# Patient Record
Sex: FEMALE | Race: WHITE
[De-identification: names, ages, dates, MRNs, and addresses within clinical notes are randomized per-mention and may not be internally consistent; named-entity substitution may affect disease eponyms.]

---

## 2022-04-13 ENCOUNTER — APPOINTMENT (OUTPATIENT)
Dept: WOUND CARE | Facility: CLINIC | Age: 72
End: 2022-04-13
Payer: MEDICARE

## 2022-04-13 VITALS
HEART RATE: 86 BPM | SYSTOLIC BLOOD PRESSURE: 162 MMHG | TEMPERATURE: 97.9 F | DIASTOLIC BLOOD PRESSURE: 94 MMHG | WEIGHT: 195 LBS | RESPIRATION RATE: 18 BRPM | BODY MASS INDEX: 33.29 KG/M2 | HEIGHT: 64 IN

## 2022-04-13 DIAGNOSIS — Z78.9 OTHER SPECIFIED HEALTH STATUS: ICD-10-CM

## 2022-04-13 DIAGNOSIS — Z86.718 PERSONAL HISTORY OF OTHER VENOUS THROMBOSIS AND EMBOLISM: ICD-10-CM

## 2022-04-13 PROBLEM — Z00.00 ENCOUNTER FOR PREVENTIVE HEALTH EXAMINATION: Status: ACTIVE | Noted: 2022-04-13

## 2022-04-13 PROCEDURE — 11042 DBRDMT SUBQ TIS 1ST 20SQCM/<: CPT

## 2022-04-13 PROCEDURE — 99204 OFFICE O/P NEW MOD 45 MIN: CPT | Mod: 25

## 2022-04-13 RX ORDER — METOPROLOL SUCCINATE 50 MG/1
50 TABLET, EXTENDED RELEASE ORAL
Refills: 0 | Status: ACTIVE | COMMUNITY

## 2022-04-13 RX ORDER — AMOXICILLIN AND CLAVULANATE POTASSIUM 500; 125 MG/1; 1/1
TABLET, FILM COATED ORAL
Refills: 0 | Status: ACTIVE | COMMUNITY

## 2022-04-13 RX ORDER — OMEPRAZOLE 40 MG/1
CAPSULE, DELAYED RELEASE ORAL
Refills: 0 | Status: ACTIVE | COMMUNITY

## 2022-04-13 RX ORDER — ATORVASTATIN CALCIUM 10 MG/1
10 TABLET, FILM COATED ORAL
Refills: 0 | Status: ACTIVE | COMMUNITY

## 2022-04-20 ENCOUNTER — APPOINTMENT (OUTPATIENT)
Dept: WOUND CARE | Facility: CLINIC | Age: 72
End: 2022-04-20
Payer: MEDICARE

## 2022-04-20 VITALS — DIASTOLIC BLOOD PRESSURE: 82 MMHG | HEART RATE: 82 BPM | SYSTOLIC BLOOD PRESSURE: 177 MMHG | TEMPERATURE: 97.6 F

## 2022-04-20 VITALS — TEMPERATURE: 97.6 F

## 2022-04-20 DIAGNOSIS — I10 ESSENTIAL (PRIMARY) HYPERTENSION: ICD-10-CM

## 2022-04-20 DIAGNOSIS — E78.00 PURE HYPERCHOLESTEROLEMIA, UNSPECIFIED: ICD-10-CM

## 2022-04-20 PROCEDURE — 11042 DBRDMT SUBQ TIS 1ST 20SQCM/<: CPT

## 2022-04-20 NOTE — PLAN
[FreeTextEntry1] : \par Follow up appointment scheduled for follow up with vascular surgery\par \par TeleHealth Services discussed with the patient and/or family.  Discharge instructions given including download of Jose [facility] information regarding:\par 1)  [facility] to obtain medical records\par 2)  [facility] to conduct Face-to-Face TeleHealth visit\par 3)  [employer] for the Patient (patient takes a picture of their wound which is sent to the patient's chart for review)\par

## 2022-04-20 NOTE — ASSESSMENT
[FreeTextEntry1] : Wound Assessment and Plan:\par \par The patient presents with a wound to the LLE.  Swelling noted to the extremity.  \par s/p excisional debridement\par DANAE/PVR \par \par Patient underwent evaluation for peripheral arterial disease using a [employer] diagnostic machine.  Ankle brachial index was obtained using blood pressure cuffs of the ankle and brachial segments of both legs.  A distal pulse volume recording was noted digitally on the device.  The procedure was supervised and interpreted by the physician.  DANAE of the right lower extremity error   secondary to movement. DANAE of the left lower extremity 1.2-.  Waveform noted to be biphasic   Patient tolerated procedure well in the office.  Results discussed with the patient.\par \par Standing venous reflux study scheduled.\par No clinical sign of infection\par Recommendation:\par \par Apply lidocaine or topical anesthetic if needed to reduce pain upon washing the wound.\par Wash wound with ----0.9% saline\par Apply ----Epiona\par Apply ----ACE bandage\par Change dressing ---daily\par Leg elevation as tolerated\par Encouraged ambulation or exercise.\par Optimization of nutrition.\par Offloading to the wound site.\par \par The patient was positioned as follows .  The fluorescence imaging device was positioned 8-12 cm from the patient and the clinical darkness required for imaging was obtained.  The wound measured by Tissue [employer]  The [employer] i:X fluorescence imaging device was used to report presence and location of red or cyan fluorescence, indicative of bacteria at loads greater than 104 CFU/g in real-time.  Images reported to be positive.  Due to presence of infection causing bacteria the wound was cleansed.  Fluorescence images acquired after cleansing reported no reduction in bacteria..  I then turned my attention to debridement, resulting in decrease in the fluorescence image.\par \par She read the consent and signed it prior to the initiation of any screening procedures.  She had the opportunity to discuss any questions regarding the study.  I witnessed the signing of the consent and the patient was given a copy of the signed consent.\par \par \par -----Wound supplies ordered via DME received\par Patient given contact information to DME\par \par Patient instructed on how to change dressing\par

## 2022-04-20 NOTE — PHYSICAL EXAM
[JVD] : no jugular venous distention  [Normal Breath Sounds] : Normal breath sounds [de-identified] : NAD, ambulatory [de-identified] : AT [de-identified] : supple

## 2022-04-20 NOTE — HISTORY OF PRESENT ILLNESS
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  h/o DVT April 5, 2022 by ultrasound started on Xarelto by PCP Dr. Singh. s/p endometrial tumor with subsequent chemoradiation and DVT 2014 where she was seen by Dr. Stevens. Patient states that she is cancer free. She likes to read and was sitting extensively. She had 4 deaths where she traveled since 6/6/22.  The patient has complaints of pain.   The patient has been dressing the wound with gauze.  The patient denies fevers or chills.  The patient has localized pain to the wound upon dressing changes.  The patient has no other complaints or associated symptoms.. Patient is on amoxicillin. \par

## 2022-04-27 ENCOUNTER — APPOINTMENT (OUTPATIENT)
Dept: WOUND CARE | Facility: CLINIC | Age: 72
End: 2022-04-27
Payer: MEDICARE

## 2022-04-27 VITALS — HEART RATE: 114 BPM | TEMPERATURE: 97.8 F | SYSTOLIC BLOOD PRESSURE: 172 MMHG | DIASTOLIC BLOOD PRESSURE: 109 MMHG

## 2022-04-27 PROCEDURE — 11042 DBRDMT SUBQ TIS 1ST 20SQCM/<: CPT

## 2022-04-27 RX ORDER — LIDOCAINE/TETRACAINE/BENZOCAIN 10-10-20 %
OINTMENT (GRAM) TOPICAL
Qty: 1 | Refills: 3 | Status: ACTIVE | COMMUNITY
Start: 2022-04-27 | End: 1900-01-01

## 2022-04-27 RX ORDER — TRAMADOL HYDROCHLORIDE 50 MG/1
50 TABLET, COATED ORAL
Qty: 14 | Refills: 0 | Status: ACTIVE | COMMUNITY
Start: 2022-04-27 | End: 1900-01-01

## 2022-04-27 RX ORDER — GENTAMICIN SULFATE 1 MG/G
0.1 OINTMENT TOPICAL DAILY
Qty: 1 | Refills: 1 | Status: ACTIVE | COMMUNITY
Start: 2022-04-27 | End: 1900-01-01

## 2022-04-29 ENCOUNTER — APPOINTMENT (OUTPATIENT)
Dept: WOUND CARE | Facility: CLINIC | Age: 72
End: 2022-04-29

## 2022-05-02 DIAGNOSIS — A49.01 METHICILLIN SUSCEPTIBLE STAPHYLOCOCCUS AUREUS INFECTION, UNSPECIFIED SITE: ICD-10-CM

## 2022-05-02 DIAGNOSIS — A49.8 OTHER BACTERIAL INFECTIONS OF UNSPECIFIED SITE: ICD-10-CM

## 2022-05-02 LAB — BACTERIA SPEC CULT: ABNORMAL

## 2022-05-02 RX ORDER — CIPROFLOXACIN HYDROCHLORIDE 500 MG/1
500 TABLET, FILM COATED ORAL
Qty: 14 | Refills: 0 | Status: ACTIVE | COMMUNITY
Start: 2022-05-02 | End: 1900-01-01

## 2022-05-02 RX ORDER — SULFAMETHOXAZOLE AND TRIMETHOPRIM 800; 160 MG/1; MG/1
800-160 TABLET ORAL TWICE DAILY
Qty: 14 | Refills: 0 | Status: ACTIVE | COMMUNITY
Start: 2022-05-02 | End: 1900-01-01

## 2022-05-03 NOTE — PLAN
[FreeTextEntry1] : 4/27/22\par Plan - follow up with derm. dr enciso, number given, needs bx.\par follow up vascular needs testing\par wash soap and water, dakins to clean, gentamicin to wound base\par pain meds prn, tramadol placed to pharmacy \par follow up next week \par

## 2022-05-03 NOTE — PLAN
[FreeTextEntry1] : \par Follow up appointment scheduled for follow up with vascular surgery\par \par TeleHealth Services discussed with the patient and/or family.  Discharge instructions given including download of Jose [facility] information regarding:\par 1)  [facility] to obtain medical records\par 2)  [facility] to conduct Face-to-Face TeleHealth visit\par 3)  [facility] for the Patient (patient takes a picture of their wound which is sent to the patient's chart for review)\par

## 2022-05-03 NOTE — PHYSICAL EXAM
[Normal Breath Sounds] : Normal breath sounds [JVD] : no jugular venous distention  [de-identified] : NAD, ambulatory [de-identified] : AT [de-identified] : supple

## 2022-05-03 NOTE — ASSESSMENT
[FreeTextEntry1] : Wound Assessment and Plan:\par \par The patient presents with a wound to the LLE.  Swelling noted to the extremity.  \par s/p excisional debridement\par DANAE/PVR \par \par Patient underwent evaluation for peripheral arterial disease using a [employer] diagnostic machine.  Ankle brachial index was obtained using blood pressure cuffs of the ankle and brachial segments of both legs.  A distal pulse volume recording was noted digitally on the device.  The procedure was supervised and interpreted by the physician.  DANAE of the right lower extremity error   secondary to movement. DANAE of the left lower extremity 1.2-.  Waveform noted to be biphasic   Patient tolerated procedure well in the office.  Results discussed with the patient.\par \par Standing venous reflux study scheduled.\par No clinical sign of infection\par Recommendation:\par \par Apply lidocaine or topical anesthetic if needed to reduce pain upon washing the wound.\par Wash wound with ----0.9% saline\par Apply ----Epiona\par Apply ----ACE bandage\par Change dressing ---daily\par Leg elevation as tolerated\par Encouraged ambulation or exercise.\par Optimization of nutrition.\par Offloading to the wound site.\par \par The patient was positioned as follows .  The fluorescence imaging device was positioned 8-12 cm from the patient and the clinical darkness required for imaging was obtained.  The wound measured by Tissue [employer]  The [employer] i:X fluorescence imaging device was used to report presence and location of red or cyan fluorescence, indicative of bacteria at loads greater than 104 CFU/g in real-time.  Images reported to be positive.  Due to presence of infection causing bacteria the wound was cleansed.  Fluorescence images acquired after cleansing reported no reduction in bacteria..  I then turned my attention to debridement, resulting in decrease in the fluorescence image.\par \par She read the consent and signed it prior to the initiation of any screening procedures.  She had the opportunity to discuss any questions regarding the study.  I witnessed the signing of the consent and the patient was given a copy of the signed consent.\par \par \par -----Wound supplies ordered via DME received\par Patient given contact information to DME\par \par Patient instructed on how to change dressing\par \par 4/27/22\par left leg wound extremely painful, was in epiona study for 1 week\par The patient was positioned to allow for optimization of imaging. The fluorescence imaging device was placed 8-12 cm from the patient and the clinical darkness required for imaging was obtained by a dark drape. The wound measured tissue analytics  on the /left ---leg. The [employer] i:X fluorescence imaging device was used to report presence and location of red or cyan fluorescence, indicative of bacteria at loads greater than 104 CFU/g in real-time. Images reported to have ---no/red/cyan----fluorescence. The wound was ----mechanically cleansed with vashe and/or underwent excisional debridement.  Fluorescence images acquired after cleansing demonstrated reduction in bacteria.\par \par

## 2022-05-03 NOTE — PHYSICAL EXAM
[Normal Breath Sounds] : Normal breath sounds [JVD] : no jugular venous distention  [de-identified] : NAD, ambulatory [de-identified] : AT [de-identified] : supple

## 2022-05-03 NOTE — ASSESSMENT
[FreeTextEntry1] : Wound Assessment and Plan:\par \par The patient presents with a wound to the LLE.  Swelling noted to the extremity.  \par DANAE/PVR and standing venous reflux study scheduled.\par No clinical sign of infection\par Recommendation:\par \par Apply lidocaine or topical anesthetic if needed to reduce pain upon washing the wound.\par Wash wound with ----0.9% saline\par Apply ----santyl\par Apply ----ACE bandage\par Change dressing ---daily\par Leg elevation as tolerated\par Encouraged ambulation or exercise.\par Optimization of nutrition.\par Offloading to the wound site.\par \par The patient was positioned as follows .  The fluorescence imaging device was positioned 8-12 cm from the patient and the clinical darkness required for imaging was obtained.  The wound measured by Tissue [employer]..  The [employer] i:X fluorescence imaging device was used to report presence and location of red or cyan fluorescence, indicative of bacteria at loads greater than 104 CFU/g in real-time.  Images reported to be positive.  Due to presence of infection causing bacteria the wound was cleansed.  Fluorescence images acquired after cleansing reported no reduction in bacteria..  I then turned my attention to debridement, resulting in decrease in the fluorescence image.\par \par \par -----Wound supplies ordered via DME\par Patient given contact information to DME\par \par Patient instructed on how to change dressing\par

## 2022-05-04 ENCOUNTER — APPOINTMENT (OUTPATIENT)
Dept: WOUND CARE | Facility: CLINIC | Age: 72
End: 2022-05-04
Payer: MEDICARE

## 2022-05-04 VITALS
HEIGHT: 64 IN | RESPIRATION RATE: 16 BRPM | SYSTOLIC BLOOD PRESSURE: 145 MMHG | DIASTOLIC BLOOD PRESSURE: 86 MMHG | WEIGHT: 194 LBS | HEART RATE: 91 BPM | BODY MASS INDEX: 33.12 KG/M2

## 2022-05-04 VITALS — TEMPERATURE: 97.4 F

## 2022-05-04 DIAGNOSIS — I83.90 ASYMPTOMATIC VARICOSE VEINS OF UNSPECIFIED LOWER EXTREMITY: ICD-10-CM

## 2022-05-04 PROCEDURE — 93922 UPR/L XTREMITY ART 2 LEVELS: CPT

## 2022-05-04 PROCEDURE — 99213 OFFICE O/P EST LOW 20 MIN: CPT

## 2022-05-04 RX ORDER — TRIAMCINOLONE ACETONIDE 0.25 MG/G
0.03 OINTMENT TOPICAL 3 TIMES DAILY
Qty: 1 | Refills: 1 | Status: ACTIVE | COMMUNITY
Start: 2022-05-04 | End: 1900-01-01

## 2022-05-04 NOTE — PHYSICAL EXAM
[Normal Breath Sounds] : Normal breath sounds [JVD] : no jugular venous distention  [de-identified] : NAD, ambulatory [de-identified] : AT [de-identified] : supple

## 2022-05-04 NOTE — PLAN
[FreeTextEntry1] : 5/4/22\par Plan - shower wound, using BLT to numb before shower gentamicin to wound base, triamcinolone to wound edges/adaptic/kamini/ace\par derm consult 5/17\par awaiting vascular workup - will have office call pt and arrange\par follow up after derm work up - 2 weeks

## 2022-05-04 NOTE — ASSESSMENT
[FreeTextEntry1] : Wound Assessment and Plan:\par \par The patient presents with a wound to the LLE.  Swelling noted to the extremity.  \par s/p excisional debridement\par DANAE/PVR \par \par Patient underwent evaluation for peripheral arterial disease using a [employer] diagnostic machine.  Ankle brachial index was obtained using blood pressure cuffs of the ankle and brachial segments of both legs.  A distal pulse volume recording was noted digitally on the device.  The procedure was supervised and interpreted by the physician.  DANAE of the right lower extremity error   secondary to movement. DANAE of the left lower extremity 1.2-.  Waveform noted to be biphasic   Patient tolerated procedure well in the office.  Results discussed with the patient.\par \par Standing venous reflux study scheduled.\par No clinical sign of infection\par Recommendation:\par \par Apply lidocaine or topical anesthetic if needed to reduce pain upon washing the wound.\par Wash wound with ----0.9% saline\par Apply ----Epiona\par Apply ----ACE bandage\par Change dressing ---daily\par Leg elevation as tolerated\par Encouraged ambulation or exercise.\par Optimization of nutrition.\par Offloading to the wound site.\par \par The patient was positioned as follows .  The fluorescence imaging device was positioned 8-12 cm from the patient and the clinical darkness required for imaging was obtained.  The wound measured by Tissue "[employer]s..  The [employer] i:X fluorescence imaging device was used to report presence and location of red or cyan fluorescence, indicative of bacteria at loads greater than 104 CFU/g in real-time.  Images reported to be positive.  Due to presence of infection causing bacteria the wound was cleansed.  Fluorescence images acquired after cleansing reported no reduction in bacteria..  I then turned my attention to debridement, resulting in decrease in the fluorescence image.\par \par She read the consent and signed it prior to the initiation of any screening procedures.  She had the opportunity to discuss any questions regarding the study.  I witnessed the signing of the consent and the patient was given a copy of the signed consent.\par \par \par -----Wound supplies ordered via DME received\par Patient given contact information to DME\par \par Patient instructed on how to change dressing\par \par 4/27/22\par left leg wound extremely painful, was in epiona study for 1 week\par The patient was positioned to allow for optimization of imaging. The fluorescence imaging device was placed 8-12 cm from the patient and the clinical darkness required for imaging was obtained by a dark drape. The wound measured tissue analytics  on the /left ---leg. The [employer] i:X fluorescence imaging device was used to report presence and location of red or cyan fluorescence, indicative of bacteria at loads greater than 104 CFU/g in real-time. Images reported to have ---no/red/cyan----fluorescence. The wound was ----mechanically cleansed with vashe and/or underwent excisional debridement.  Fluorescence images acquired after cleansing demonstrated reduction in bacteria.\par 5/4/22\par culture grew out pseudomonas and staph, on cipro, will wait and take bactrim when finished with first\par

## 2022-05-04 NOTE — HISTORY OF PRESENT ILLNESS
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  h/o DVT April 5, 2022 by ultrasound started on Xarelto by PCP Dr. Singh. s/p endometrial tumor with subsequent chemoradiation and DVT 2014 where she was seen by Dr. Stevens. Patient states that she is cancer free. She likes to read and was sitting extensively. She had 4 deaths where she traveled since 6/6/22.  The patient has complaints of pain.   The patient has been dressing the wound with gauze.  The patient denies fevers or chills.  The patient has localized pain to the wound upon dressing changes.  The patient has no other complaints or associated symptoms.. Patient is on amoxicillin. \par \par 5/4/22\par The patient was positioned to allow for optimization of imaging. The fluorescence imaging device was placed 8-12 cm from the patient and the clinical darkness required for imaging was obtained by a dark drape. The wound measured   ----- cm2 on the /left ---leg/foot). The [employer] i:X fluorescence imaging device was used to report presence and location of NO  red or cyan fluorescence, indicative of bacteria at loads greater than 104 CFU/g in real-time. \par s/p excisional debridement\par suspicious for pyoderma, purplish hue to periwound, wound is very painful\par

## 2022-05-06 ENCOUNTER — APPOINTMENT (OUTPATIENT)
Dept: VASCULAR SURGERY | Facility: CLINIC | Age: 72
End: 2022-05-06
Payer: MEDICARE

## 2022-05-06 VITALS
BODY MASS INDEX: 33.12 KG/M2 | TEMPERATURE: 96.5 F | WEIGHT: 194 LBS | SYSTOLIC BLOOD PRESSURE: 147 MMHG | DIASTOLIC BLOOD PRESSURE: 78 MMHG | HEART RATE: 90 BPM | HEIGHT: 64 IN

## 2022-05-06 PROCEDURE — 99204 OFFICE O/P NEW MOD 45 MIN: CPT

## 2022-05-06 PROCEDURE — 93971 EXTREMITY STUDY: CPT

## 2022-05-06 NOTE — ASSESSMENT
[FreeTextEntry1] : 72F with LLE wound medial malleollus, + LE DVT.  No reflux noted on venous duplex.  DVT from gastroc to SFV. \par \par UNNA boot applied\par Follow up in wound care center \par Derm for pyoderma biopsy \par Follow up in one month

## 2022-05-06 NOTE — HISTORY OF PRESENT ILLNESS
[FreeTextEntry1] : 72F with LLE wound medial malleolus present for 3 months despite local wound care and abx.  Patient has history of LLE DVT 2014 for which she was on Xarelto- provoked secondary to endometrial CA.  Patient now with new LLE DVT diagnosed 4-5 weeks prior at PCP, again on Xarelto.  Admits to LE swelling, does not wear compression.  Denies claudication symptoms.  \par \par PMH- endometrial CA, HTN, HLD, [place]''s dx\par PSH- hysterectomy\par Meds- metoprolol, statin\par Allergies- NKDA\par Social- denies tobacco, illicit drug use.  + EtOH, daily glass of wine.

## 2022-05-06 NOTE — END OF VISIT
[] : Fellow [FreeTextEntry3] : medial mal ulcer, in the setting of dvt/venous hypertension\par compression, local wound care\par pt to have biopsy to ro pyoderma/malignancy [Time Spent: ___ minutes] : I have spent [unfilled] minutes of time on the encounter.

## 2022-05-06 NOTE — PHYSICAL EXAM
[Normal Breath Sounds] : Normal breath sounds [Normal Rate and Rhythm] : normal rate and rhythm [2+] : left 2+ [Ankle Swelling (On Exam)] : present [Ankle Swelling Bilaterally] : bilaterally  [] : of the left leg [Ankle Swelling On The Left] : moderate [Skin Ulcer] : ulcer [Alert] : alert [Oriented to Person] : oriented to person [Oriented to Place] : oriented to place [Oriented to Time] : oriented to time [Anxious] : anxious [JVD] : no jugular venous distention  [Varicose Veins Of Lower Extremities] : not present [de-identified] : NAD, obese [de-identified] : LLE wound above medial malleolus approx 6cm in diameter without purulence or foul smell

## 2022-05-06 NOTE — REVIEW OF SYSTEMS
[As Noted in HPI] : as noted in HPI [Lower Ext Edema] : lower extremity edema [Negative] : Heme/Lymph [Leg Claudication] : no intermittent leg claudication

## 2022-05-11 ENCOUNTER — APPOINTMENT (OUTPATIENT)
Dept: WOUND CARE | Facility: CLINIC | Age: 72
End: 2022-05-11

## 2022-05-11 ENCOUNTER — APPOINTMENT (OUTPATIENT)
Dept: WOUND CARE | Facility: CLINIC | Age: 72
End: 2022-05-11
Payer: MEDICARE

## 2022-05-11 VITALS
HEART RATE: 91 BPM | SYSTOLIC BLOOD PRESSURE: 148 MMHG | HEIGHT: 64 IN | TEMPERATURE: 96.2 F | RESPIRATION RATE: 16 BRPM | WEIGHT: 194 LBS | BODY MASS INDEX: 33.12 KG/M2 | DIASTOLIC BLOOD PRESSURE: 84 MMHG

## 2022-05-11 PROCEDURE — 99213 OFFICE O/P EST LOW 20 MIN: CPT | Mod: 25

## 2022-05-11 PROCEDURE — 11042 DBRDMT SUBQ TIS 1ST 20SQCM/<: CPT

## 2022-05-12 NOTE — HISTORY OF PRESENT ILLNESS
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  h/o DVT April 5, 2022 by ultrasound started on Xarelto by PCP Dr. Singh. s/p endometrial tumor with subsequent chemoradiation and DVT 2014 where she was seen by Dr. Stevens. Patient states that she is cancer free. She likes to read and was sitting extensively. She had 4 deaths where she traveled since 6/6/22.  The patient has complaints of pain.   The patient has been dressing the wound with gauze.  The patient denies fevers or chills.  The patient has localized pain to the wound upon dressing changes.  The patient has no other complaints or associated symptoms.. Patient is on amoxicillin. \par \par 5/4/22\par The patient was positioned to allow for optimization of imaging. The fluorescence imaging device was placed 8-12 cm from the patient and the clinical darkness required for imaging was obtained by a dark drape. The wound measured   ----- cm2 on the /left ---leg/foot). The [employer] i:X fluorescence imaging device was used to report presence and location of NO  red or cyan fluorescence, indicative of bacteria at loads greater than 104 CFU/g in real-time. \par s/p excisional debridement\par suspicious for pyoderma, purplish hue to periwound, wound is very painful\par

## 2022-05-12 NOTE — PHYSICAL EXAM
[Normal Breath Sounds] : Normal breath sounds [JVD] : no jugular venous distention  [de-identified] : NAD, ambulatory [de-identified] : AT [de-identified] : supple

## 2022-05-12 NOTE — ASSESSMENT
[FreeTextEntry1] : Wound Assessment and Plan:\par \par The patient presents with a wound to the LLE.  Swelling noted to the extremity.  \par s/p excisional debridement\par DANAE/PVR \par \par Patient underwent evaluation for peripheral arterial disease using a [employer] diagnostic machine.  Ankle brachial index was obtained using blood pressure cuffs of the ankle and brachial segments of both legs.  A distal pulse volume recording was noted digitally on the device.  The procedure was supervised and interpreted by the physician.  DANAE of the right lower extremity error   secondary to movement. DANAE of the left lower extremity 1.2-.  Waveform noted to be biphasic   Patient tolerated procedure well in the office.  Results discussed with the patient.\par \par Standing venous reflux study scheduled.\par No clinical sign of infection\par Recommendation:\par \par \par Leg elevation as tolerated\par Encouraged ambulation or exercise.\par Optimization of nutrition.\par Offloading to the wound site.\par \par The patient was positioned as follows .  The fluorescence imaging device was positioned 8-12 cm from the patient and the clinical darkness required for imaging was obtained.  The wound measured by Tissue [employer]..  The "[employer] i:X fluorescence imaging device was used to report presence and location of red or cyan fluorescence, indicative of bacteria at loads greater than 104 CFU/g in real-time.  Images reported to be positive.  Due to presence of infection causing bacteria the wound was cleansed.  Fluorescence images acquired after cleansing reported no reduction in bacteria..  I then turned my attention to debridement, resulting in decrease in the fluorescence image.\par \par She read the consent and signed it prior to the initiation of any screening procedures.  She had the opportunity to discuss any questions regarding the study.  I witnessed the signing of the consent and the patient was given a copy of the signed consent.\par \par \par -----Wound supplies ordered via DME received\par Patient given contact information to DME\par \par Patient instructed on how to change dressing\par \par 4/27/22\par left leg wound extremely painful, was in epiona study for 1 week\par The patient was positioned to allow for optimization of imaging. The fluorescence imaging device was placed 8-12 cm from the patient and the clinical darkness required for imaging was obtained by a dark drape. The wound measured tissue analytics  on the /left ---leg. The "[employer] i:X fluorescence imaging device was used to report presence and location of red or cyan fluorescence, indicative of bacteria at loads greater than 104 CFU/g in real-time. Images reported to have ---no/red/cyan----fluorescence. The wound was ----mechanically cleansed with vashe and/or underwent excisional debridement.  Fluorescence images acquired after cleansing demonstrated reduction in bacteria.\par 5/4/22\par culture grew out pseudomonas and staph, on cipro, will wait and take bactrim when finished with first\par 5/11/22\par pt. taking abx. as prescribed\par wound still remains painful, appearance slightly better, been applying gentamicin\par saw vascular - no evidence of venous insufficiency\par slight purplish halo to periwound, wound not as hypergranular\par s/p excisional debridement\par

## 2022-05-12 NOTE — PLAN
[FreeTextEntry1] : 5/11/22\par Plan - shower wound, using BLT to numb before shower gentamicin to wound base, triamcinolone to wound edges/adaptic/kamini/ace\par derm consult 5/17\par follow up 1 week

## 2022-05-18 ENCOUNTER — APPOINTMENT (OUTPATIENT)
Dept: WOUND CARE | Facility: CLINIC | Age: 72
End: 2022-05-18

## 2022-05-18 ENCOUNTER — APPOINTMENT (OUTPATIENT)
Dept: WOUND CARE | Facility: CLINIC | Age: 72
End: 2022-05-18
Payer: MEDICARE

## 2022-05-18 VITALS
DIASTOLIC BLOOD PRESSURE: 85 MMHG | TEMPERATURE: 97.6 F | HEART RATE: 88 BPM | BODY MASS INDEX: 33.3 KG/M2 | SYSTOLIC BLOOD PRESSURE: 130 MMHG | RESPIRATION RATE: 16 BRPM | WEIGHT: 194 LBS

## 2022-05-18 PROCEDURE — 29581 APPL MULTLAYER CMPRN SYS LEG: CPT | Mod: LT

## 2022-05-20 NOTE — ASSESSMENT
[FreeTextEntry1] : Wound Assessment and Plan:\par \par The patient presents with a wound to the LLE.  Swelling noted to the extremity.  \par s/p excisional debridement\par DANAE/PVR \par \par Patient underwent evaluation for peripheral arterial disease using a [employer] diagnostic machine.  Ankle brachial index was obtained using blood pressure cuffs of the ankle and brachial segments of both legs.  A distal pulse volume recording was noted digitally on the device.  The procedure was supervised and interpreted by the physician.  DANAE of the right lower extremity error   secondary to movement. DANAE of the left lower extremity 1.2-.  Waveform noted to be biphasic   Patient tolerated procedure well in the office.  Results discussed with the patient.\par \par Standing venous reflux study scheduled.\par No clinical sign of infection\par Recommendation:\par \par \par Leg elevation as tolerated\par Encouraged ambulation or exercise.\par Optimization of nutrition.\par Offloading to the wound site.\par \par The patient was positioned as follows .  The fluorescence imaging device was positioned 8-12 cm from the patient and the clinical darkness required for imaging was obtained.  The wound measured by Tissue [employer]..  The [employer] i:X fluorescence imaging device was used to report presence and location of red or cyan fluorescence, indicative of bacteria at loads greater than 104 CFU/g in real-time.  Images reported to be positive.  Due to presence of infection causing bacteria the wound was cleansed.  Fluorescence images acquired after cleansing reported no reduction in bacteria..  I then turned my attention to debridement, resulting in decrease in the fluorescence image.\par \par She read the consent and signed it prior to the initiation of any screening procedures.  She had the opportunity to discuss any questions regarding the study.  I witnessed the signing of the consent and the patient was given a copy of the signed consent.\par \par \par -----Wound supplies ordered via DME received\par Patient given contact information to DME\par \par Patient instructed on how to change dressing\par \par 4/27/22\par left leg wound extremely painful, was in epiona study for 1 week\par The patient was positioned to allow for optimization of imaging. The fluorescence imaging device was placed 8-12 cm from the patient and the clinical darkness required for imaging was obtained by a dark drape. The wound measured tissue analytics  on the /left ---leg. The [employer] i:X fluorescence imaging device was used to report presence and location of red or cyan fluorescence, indicative of bacteria at loads greater than 104 CFU/g in real-time. Images reported to have ---no/red/cyan----fluorescence. The wound was ----mechanically cleansed with vashe and/or underwent excisional debridement.  Fluorescence images acquired after cleansing demonstrated reduction in bacteria.\par 5/4/22\par culture grew out pseudomonas and staph, on cipro, will wait and take bactrim when finished with first\par 5/11/22\par pt. taking abx. as prescribed\par wound still remains painful, appearance slightly better, been applying gentamicin\par saw vascular - no evidence of venous insufficiency\par slight purplish halo to periwound, wound not as hypergranular\par s/p excisional debridement\par 5/18/22\par saw derm. yesterday, biopsy taken\par placed on doxy. and sulfasalazine\par no debridement today, wound being treated with gentamicin and santyl, slough is improved\par

## 2022-05-20 NOTE — PLAN
[FreeTextEntry1] : 5/18/222\par Plan - santyl/gent. to wound/adaptic/xtrasorb/unna\par take probiotic while finishing up bactrim and starting on doxy\par follow up next week\par supplies ordered\par leave on unna till saturday, then remove, shower, santyl/gent.adaptic/kamini/ace\par following up with derm in june

## 2022-05-20 NOTE — HISTORY OF PRESENT ILLNESS
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  h/o DVT April 5, 2022 by ultrasound started on Xarelto by PCP Dr. Singh. s/p endometrial tumor with subsequent chemoradiation and DVT 2014 where she was seen by Dr. Stevens. Patient states that she is cancer free. She likes to read and was sitting extensively. She had 4 deaths where she traveled since 6/6/22.  The patient has complaints of pain.   The patient has been dressing the wound with gauze.  The patient denies fevers or chills.  The patient has localized pain to the wound upon dressing changes.  The patient has no other complaints or associated symptoms.. Patient is on amoxicillin. \par \par 5/4/22\par The patient was positioned to allow for optimization of imaging. The fluorescence imaging device was placed 8-12 cm from the patient and the clinical darkness required for imaging was obtained by a dark drape. The wound measured   ----- cm2 on the /left ---leg/foot). The [employer] i:X fluorescence imaging device was used to report presence and location of NO  red or cyan fluorescence, indicative of bacteria at loads greater than 104 CFU/g in real-time. \par s/p excisional debridement\par suspicious for pyoderma, purplish hue to periwound, wound is very painful\par

## 2022-05-20 NOTE — PHYSICAL EXAM
[Normal Breath Sounds] : Normal breath sounds [JVD] : no jugular venous distention  [de-identified] : NAD, ambulatory [de-identified] : AT [de-identified] : supple

## 2022-05-24 ENCOUNTER — APPOINTMENT (OUTPATIENT)
Dept: WOUND CARE | Facility: CLINIC | Age: 72
End: 2022-05-24
Payer: MEDICARE

## 2022-05-24 PROCEDURE — 11042 DBRDMT SUBQ TIS 1ST 20SQCM/<: CPT

## 2022-05-24 RX ORDER — COLLAGENASE SANTYL 250 [ARB'U]/G
250 OINTMENT TOPICAL DAILY
Qty: 1 | Refills: 0 | Status: ACTIVE | COMMUNITY
Start: 2022-05-24 | End: 1900-01-01

## 2022-05-27 NOTE — HISTORY OF PRESENT ILLNESS
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  h/o DVT April 5, 2022 by ultrasound started on Xarelto by PCP Dr. Singh. s/p endometrial tumor with subsequent chemoradiation and DVT 2014 where she was seen by Dr. Stevens. Patient states that she is cancer free. She likes to read and was sitting extensively. She had 4 deaths where she traveled since 6/6/22.  The patient has complaints of pain.   The patient has been dressing the wound with gauze.  The patient denies fevers or chills.  The patient has localized pain to the wound upon dressing changes.  The patient has no other complaints or associated symptoms.. Patient is on amoxicillin. \par \par 5/4/22\par The patient was positioned to allow for optimization of imaging. The fluorescence imaging device was placed 8-12 cm from the patient and the clinical darkness required for imaging was obtained by a dark drape. The wound measured   ----- cm2 on the /left ---leg/foot). The [employer] i:X fluorescence imaging device was used to report presence and location of NO  red or cyan fluorescence, indicative of bacteria at loads greater than 104 CFU/g in real-time. \par s/p excisional debridement\par suspicious for pyoderma, purplish hue to periwound, wound is very painful\par

## 2022-05-27 NOTE — PLAN
[FreeTextEntry1] : 5/24/22\par Plan - stop gent. start santyl\par santyl to vivo, pt. will call to see co-pay  and start using to wound bed/adaptic/kamini/unna\par follow up 2 weeks

## 2022-05-27 NOTE — PHYSICAL EXAM
[Normal Breath Sounds] : Normal breath sounds [JVD] : no jugular venous distention  [de-identified] : NAD, ambulatory [de-identified] : AT [de-identified] : supple

## 2022-05-27 NOTE — ASSESSMENT
[FreeTextEntry1] : Wound Assessment and Plan:\par \par The patient presents with a wound to the LLE.  Swelling noted to the extremity.  \par s/p excisional debridement\par DANAE/PVR \par \par Patient underwent evaluation for peripheral arterial disease using a [employer] diagnostic machine.  Ankle brachial index was obtained using blood pressure cuffs of the ankle and brachial segments of both legs.  A distal pulse volume recording was noted digitally on the device.  The procedure was supervised and interpreted by the physician.  DANAE of the right lower extremity error   secondary to movement. DANAE of the left lower extremity 1.2-.  Waveform noted to be biphasic   Patient tolerated procedure well in the office.  Results discussed with the patient.\par \par Standing venous reflux study scheduled.\par No clinical sign of infection\par Recommendation:\par \par \par Leg elevation as tolerated\par Encouraged ambulation or exercise.\par Optimization of nutrition.\par Offloading to the wound site.\par \par The patient was positioned as follows .  The fluorescence imaging device was positioned 8-12 cm from the patient and the clinical darkness required for imaging was obtained.  The wound measured by Tissue [employer]..  The [employer] i:X fluorescence imaging device was used to report presence and location of red or cyan fluorescence, indicative of bacteria at loads greater than 104 CFU/g in real-time.  Images reported to be positive.  Due to presence of infection causing bacteria the wound was cleansed.  Fluorescence images acquired after cleansing reported no reduction in bacteria..  I then turned my attention to debridement, resulting in decrease in the fluorescence image.\par \par She read the consent and signed it prior to the initiation of any screening procedures.  She had the opportunity to discuss any questions regarding the study.  I witnessed the signing of the consent and the patient was given a copy of the signed consent.\par \par \par -----Wound supplies ordered via DME received\par Patient given contact information to DME\par \par Patient instructed on how to change dressing\par \par 4/27/22\par left leg wound extremely painful, was in epiona study for 1 week\par The patient was positioned to allow for optimization of imaging. The fluorescence imaging device was placed 8-12 cm from the patient and the clinical darkness required for imaging was obtained by a dark drape. The wound measured tissue analytics  on the /left ---leg. The [employer] i:X fluorescence imaging device was used to report presence and location of red or cyan fluorescence, indicative of bacteria at loads greater than 104 CFU/g in real-time. Images reported to have ---no/red/cyan----fluorescence. The wound was ----mechanically cleansed with vashe and/or underwent excisional debridement.  Fluorescence images acquired after cleansing demonstrated reduction in bacteria.\par 5/4/22\par culture grew out pseudomonas and staph, on cipro, will wait and take bactrim when finished with first\par 5/11/22\par pt. taking abx. as prescribed\par wound still remains painful, appearance slightly better, been applying gentamicin\par saw vascular - no evidence of venous insufficiency\par slight purplish halo to periwound, wound not as hypergranular\par s/p excisional debridement\par 5/18/22\par saw derm. yesterday, biopsy taken\par placed on doxy. and sulfasalazine\par no debridement today, wound being treated with gentamicin and santyl, slough is improved\par 5/24/22\par still on doxy. sulfasalazine per derm.\par light s/p excisional debridement, of moist yellow slough\par wound is slowly more granular \par using gentamicin\par awaiting pathology of wound\par wound with more pink tissue coming through\par \par \par

## 2022-06-10 ENCOUNTER — APPOINTMENT (OUTPATIENT)
Dept: VASCULAR SURGERY | Facility: CLINIC | Age: 72
End: 2022-06-10
Payer: MEDICARE

## 2022-06-10 ENCOUNTER — APPOINTMENT (OUTPATIENT)
Dept: VASCULAR SURGERY | Facility: CLINIC | Age: 72
End: 2022-06-10

## 2022-06-10 PROCEDURE — 99442: CPT | Mod: 95

## 2022-06-10 RX ORDER — PENTOXIFYLLINE 400 MG/1
400 TABLET, EXTENDED RELEASE ORAL 3 TIMES DAILY
Qty: 180 | Refills: 0 | Status: ACTIVE | COMMUNITY
Start: 2022-06-10 | End: 1900-01-01

## 2022-06-10 NOTE — ASSESSMENT
[FreeTextEntry1] : lower leg ulcer, chronic venous hypertension from DVT\par cont AC (on xarelto)\par local wound care, has fu \par fu pathology from bios, I have asked her to obtain records for us to review\par \par I have recommended pentoxyfylline, rx sent to pharmacy\par \par fu 1 mo, LLE duplex

## 2022-06-10 NOTE — HISTORY OF PRESENT ILLNESS
[FreeTextEntry1] : 72F with LLE wound medial malleolus present for 3 months despite local wound care and abx. Patient has history of LLE DVT 2014 for which she was on Xarelto- provoked secondary to endometrial CA. Patient now with new LLE DVT diagnosed 4-5 weeks prior at PCP, again on Xarelto. Admits to LE swelling, does not wear compression. Denies claudication symptoms. \par \par PMH- endometrial CA, HTN, HLD, Antolin''s dx\par PSH- hysterectomy\par Meds- metoprolol, statin\par Allergies- NKDA\par Social- denies tobacco, illicit drug use. + EtOH, daily glass of wine. \par   [de-identified] : 6/10/22: telephone visit.  pt has been seeing wound care regularly, also had biopsy w derm.  Per pt biosy showed no malignancy, however I do not have these results.  She is still experiencing some throbbing discomfort, but overall improved.  Wound appearance also noted to be improved.  no fevers.

## 2022-06-10 NOTE — PHYSICAL EXAM
[JVD] : no jugular venous distention  [Normal Breath Sounds] : Normal breath sounds [Normal Heart Sounds] : normal heart sounds [2+] : left 2+ [Ankle Swelling (On Exam)] : present [Ankle Swelling Bilaterally] : bilaterally  [] : of the left leg [Ankle Swelling On The Left] : moderate [Alert] : alert [Oriented to Person] : oriented to person [Oriented to Place] : oriented to place [Oriented to Time] : oriented to time [de-identified] : nad [de-identified] : ulcer . LLE wound above medial malleolus approx 6cm in diameter without purulence or foul smell.

## 2022-06-17 ENCOUNTER — APPOINTMENT (OUTPATIENT)
Dept: WOUND CARE | Facility: CLINIC | Age: 72
End: 2022-06-17

## 2022-06-17 PROCEDURE — 29581 APPL MULTLAYER CMPRN SYS LEG: CPT

## 2022-06-21 NOTE — ASSESSMENT
[FreeTextEntry1] : Wound Assessment and Plan:\par \par The patient presents with a wound to the LLE.  Swelling noted to the extremity.  \par s/p excisional debridement\par DANAE/PVR \par \par Patient underwent evaluation for peripheral arterial disease using a [employer] diagnostic machine.  Ankle brachial index was obtained using blood pressure cuffs of the ankle and brachial segments of both legs.  A distal pulse volume recording was noted digitally on the device.  The procedure was supervised and interpreted by the physician.  DANAE of the right lower extremity error   secondary to movement. DANAE of the left lower extremity 1.2-.  Waveform noted to be biphasic   Patient tolerated procedure well in the office.  Results discussed with the patient.\par \par Standing venous reflux study scheduled.\par No clinical sign of infection\par Recommendation:\par \par \par Leg elevation as tolerated\par Encouraged ambulation or exercise.\par Optimization of nutrition.\par Offloading to the wound site.\par \par The patient was positioned as follows .  The fluorescence imaging device was positioned 8-12 cm from the patient and the clinical darkness required for imaging was obtained.  The wound measured by Tissue [employer]..  The [employer] i:X fluorescence imaging device was used to report presence and location of red or cyan fluorescence, indicative of bacteria at loads greater than 104 CFU/g in real-time.  Images reported to be positive.  Due to presence of infection causing bacteria the wound was cleansed.  Fluorescence images acquired after cleansing reported no reduction in bacteria..  I then turned my attention to debridement, resulting in decrease in the fluorescence image.\par \par She read the consent and signed it prior to the initiation of any screening procedures.  She had the opportunity to discuss any questions regarding the study.  I witnessed the signing of the consent and the patient was given a copy of the signed consent.\par \par \par -----Wound supplies ordered via DME received\par Patient given contact information to DME\par \par Patient instructed on how to change dressing\par \par 4/27/22\par left leg wound extremely painful, was in epiona study for 1 week\par The patient was positioned to allow for optimization of imaging. The fluorescence imaging device was placed 8-12 cm from the patient and the clinical darkness required for imaging was obtained by a dark drape. The wound measured tissue analytics  on the /left ---leg. The [employer] i:X fluorescence imaging device was used to report presence and location of red or cyan fluorescence, indicative of bacteria at loads greater than 104 CFU/g in real-time. Images reported to have ---no/red/cyan----fluorescence. The wound was ----mechanically cleansed with vashe and/or underwent excisional debridement.  Fluorescence images acquired after cleansing demonstrated reduction in bacteria.\par 5/4/22\par culture grew out pseudomonas and staph, on cipro, will wait and take bactrim when finished with first\par 5/11/22\par pt. taking abx. as prescribed\par wound still remains painful, appearance slightly better, been applying gentamicin\par saw vascular - no evidence of venous insufficiency\par slight purplish halo to periwound, wound not as hypergranular\par s/p excisional debridement\par 5/18/22\par saw derm. yesterday, biopsy taken\par placed on doxy. and sulfasalazine\par no debridement today, wound being treated with gentamicin and santyl, slough is improved\par 5/24/22\par still on doxy. sulfasalazine per derm.\par light s/p excisional debridement, of moist yellow slough\par wound is slowly more granular \par using gentamicin\par awaiting pathology of wound\par wound with more pink tissue coming through\par 6/17/22\par wound with slow progress, mechanical debridement\par has been using santyl and gentamicin, granular tissue coming through\par followed up with derm. feels it is related to prior DVT\par responds well to compression\par \par \par

## 2022-06-21 NOTE — HISTORY OF PRESENT ILLNESS
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  h/o DVT April 5, 2022 by ultrasound started on Xarelto by PCP Dr. Singh. s/p endometrial tumor with subsequent chemoradiation and DVT 2014 where she was seen by Dr. Stevens. Patient states that she is cancer free. She likes to read and was sitting extensively. She had 4 deaths where she traveled since 6/6/22.  The patient has complaints of pain.   The patient has been dressing the wound with gauze.  The patient denies fevers or chills.  The patient has localized pain to the wound upon dressing changes.  The patient has no other complaints or associated symptoms.. Patient is on amoxicillin. \par \par 5/4/22\par The patient was positioned to allow for optimization of imaging. The fluorescence imaging device was placed 8-12 cm from the patient and the clinical darkness required for imaging was obtained by a dark drape. The wound measured   ----- cm2 on the /left ---leg/foot). The [employer] i:X fluorescence imaging device was used to report presence and location of NO  red or cyan fluorescence, indicative of bacteria at loads greater than 104 CFU/g in real-time. \par s/p excisional debridement\par suspicious for pyoderma, purplish hue to periwound, wound is very painful\par

## 2022-06-21 NOTE — PHYSICAL EXAM
[Normal Breath Sounds] : Normal breath sounds [JVD] : no jugular venous distention  [de-identified] : NAD, ambulatory [de-identified] : AT [de-identified] : supple

## 2022-06-21 NOTE — PLAN
[FreeTextEntry1] : 6/17/22\par Plan - follow up with hematology (pt. has name )\par santyl applied, xtrasorb/unna, will leave on until next week when has visit with derm.\par follow up 2 weeks

## 2022-06-30 ENCOUNTER — APPOINTMENT (OUTPATIENT)
Dept: WOUND CARE | Facility: CLINIC | Age: 72
End: 2022-06-30

## 2022-07-01 ENCOUNTER — APPOINTMENT (OUTPATIENT)
Dept: WOUND CARE | Facility: CLINIC | Age: 72
End: 2022-07-01

## 2022-07-01 DIAGNOSIS — B36.9 SUPERFICIAL MYCOSIS, UNSPECIFIED: ICD-10-CM

## 2022-07-01 PROCEDURE — 11042 DBRDMT SUBQ TIS 1ST 20SQCM/<: CPT

## 2022-07-01 RX ORDER — NYSTATIN 100000 U/G
100000 OINTMENT TOPICAL 3 TIMES DAILY
Qty: 1 | Refills: 2 | Status: ACTIVE | COMMUNITY
Start: 2022-07-01 | End: 1900-01-01

## 2022-07-07 PROBLEM — B36.9 FUNGAL DERMATITIS: Status: ACTIVE | Noted: 2022-07-01

## 2022-07-07 NOTE — PHYSICAL EXAM
[Normal Breath Sounds] : Normal breath sounds [JVD] : no jugular venous distention  [de-identified] : NAD, ambulatory [de-identified] : AT [de-identified] : supple

## 2022-07-07 NOTE — PLAN
[FreeTextEntry1] : 7/1/22\par Plan - nystatin to pharmacy, apply to periwound, where itching\par supplies ordered\par will remove unna 3 days, will have  apply dressing and unna\par xtrasorb/unna applied\par follow up 2 weeks

## 2022-07-07 NOTE — HISTORY OF PRESENT ILLNESS
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  h/o DVT April 5, 2022 by ultrasound started on Xarelto by PCP Dr. Singh. s/p endometrial tumor with subsequent chemoradiation and DVT 2014 where she was seen by Dr. Stevens. Patient states that she is cancer free. She likes to read and was sitting extensively. She had 4 deaths where she traveled since 6/6/22.  The patient has complaints of pain.   The patient has been dressing the wound with gauze.  The patient denies fevers or chills.  The patient has localized pain to the wound upon dressing changes.  The patient has no other complaints or associated symptoms.. Patient is on amoxicillin. \par \par 5/4/22\par The patient was positioned to allow for optimization of imaging. The fluorescence imaging device was placed 8-12 cm from the patient and the clinical darkness required for imaging was obtained by a dark drape. The wound measured   ----- cm2 on the /left ---leg/foot). The [employer] i:X fluorescence imaging device was used to report presence and location of NO  red or cyan fluorescence, indicative of bacteria at loads greater than 104 CFU/g in real-time. \par s/p excisional debridement\par suspicious for pyoderma, purplish hue to periwound, wound is very painful\par

## 2022-07-07 NOTE — ASSESSMENT
[FreeTextEntry1] : Wound Assessment and Plan:\par \par The patient presents with a wound to the LLE.  Swelling noted to the extremity.  \par s/p excisional debridement\par DANAE/PVR \par \par Patient underwent evaluation for peripheral arterial disease using a [employer] diagnostic machine.  Ankle brachial index was obtained using blood pressure cuffs of the ankle and brachial segments of both legs.  A distal pulse volume recording was noted digitally on the device.  The procedure was supervised and interpreted by the physician.  DANAE of the right lower extremity error   secondary to movement. DANAE of the left lower extremity 1.2-.  Waveform noted to be biphasic   Patient tolerated procedure well in the office.  Results discussed with the patient.\par \par Standing venous reflux study scheduled.\par No clinical sign of infection\par Recommendation:\par \par \par Leg elevation as tolerated\par Encouraged ambulation or exercise.\par Optimization of nutrition.\par Offloading to the wound site.\par \par The patient was positioned as follows .  The fluorescence imaging device was positioned 8-12 cm from the patient and the clinical darkness required for imaging was obtained.  The wound measured by Tissue [employer]..  The [employer] i:X fluorescence imaging device was used to report presence and location of red or cyan fluorescence, indicative of bacteria at loads greater than 104 CFU/g in real-time.  Images reported to be positive.  Due to presence of infection causing bacteria the wound was cleansed.  Fluorescence images acquired after cleansing reported no reduction in bacteria..  I then turned my attention to debridement, resulting in decrease in the fluorescence image.\par \par She read the consent and signed it prior to the initiation of any screening procedures.  She had the opportunity to discuss any questions regarding the study.  I witnessed the signing of the consent and the patient was given a copy of the signed consent.\par \par \par -----Wound supplies ordered via DME received\par Patient given contact information to DME\par \par Patient instructed on how to change dressing\par \par 4/27/22\par left leg wound extremely painful, was in epiona study for 1 week\par The patient was positioned to allow for optimization of imaging. The fluorescence imaging device was placed 8-12 cm from the patient and the clinical darkness required for imaging was obtained by a dark drape. The wound measured tissue analytics  on the /left ---leg. The [employer] i:X fluorescence imaging device was used to report presence and location of red or cyan fluorescence, indicative of bacteria at loads greater than 104 CFU/g in real-time. Images reported to have ---no/red/cyan----fluorescence. The wound was ----mechanically cleansed with vashe and/or underwent excisional debridement.  Fluorescence images acquired after cleansing demonstrated reduction in bacteria.\par 5/4/22\par culture grew out pseudomonas and staph, on cipro, will wait and take bactrim when finished with first\par 5/11/22\par pt. taking abx. as prescribed\par wound still remains painful, appearance slightly better, been applying gentamicin\par saw vascular - no evidence of venous insufficiency\par slight purplish halo to periwound, wound not as hypergranular\par s/p excisional debridement\par 5/18/22\par saw derm. yesterday, biopsy taken\par placed on doxy. and sulfasalazine\par no debridement today, wound being treated with gentamicin and santyl, slough is improved\par 5/24/22\par still on doxy. sulfasalazine per derm.\par light s/p excisional debridement, of moist yellow slough\par wound is slowly more granular \par using gentamicin\par awaiting pathology of wound\par wound with more pink tissue coming through\par 6/17/22\par wound with slow progress, mechanical debridement\par has been using santyl and gentamicin, granular tissue coming through\par followed up with derm. feels it is related to prior DVT\par responds well to compression\par \par 7/1/22\par s/p excisional debridement, could not tolerate too much, still having pain, parts of wound tender, smaller\par saw derm. grew out pseudomonas, on levaquin, on sulfasalazine 500 mg 2x day\par per derm - no PG\par saw hematology getting a work up\par saw vascular - recommended pentoxifylline , has not taken\par \par \par

## 2022-07-14 ENCOUNTER — APPOINTMENT (OUTPATIENT)
Dept: WOUND CARE | Facility: CLINIC | Age: 72
End: 2022-07-14

## 2022-07-14 VITALS — TEMPERATURE: 97.8 F

## 2022-07-14 DIAGNOSIS — S81.802D UNSPECIFIED OPEN WOUND, LEFT LOWER LEG, SUBSEQUENT ENCOUNTER: ICD-10-CM

## 2022-07-14 PROCEDURE — 29581 APPL MULTLAYER CMPRN SYS LEG: CPT

## 2022-07-14 NOTE — ASSESSMENT
[FreeTextEntry1] : Wound Assessment and Plan:\par \par The patient presents with a wound to the LLE.  Swelling noted to the extremity.  \par s/p excisional debridement\par DANAE/PVR \par \par Patient underwent evaluation for peripheral arterial disease using a [employer] diagnostic machine.  Ankle brachial index was obtained using blood pressure cuffs of the ankle and brachial segments of both legs.  A distal pulse volume recording was noted digitally on the device.  The procedure was supervised and interpreted by the physician.  DANAE of the right lower extremity error   secondary to movement. DANAE of the left lower extremity 1.2-.  Waveform noted to be biphasic   Patient tolerated procedure well in the office.  Results discussed with the patient.\par \par Standing venous reflux study scheduled.\par No clinical sign of infection\par Recommendation:\par \par \par Leg elevation as tolerated\par Encouraged ambulation or exercise.\par Optimization of nutrition.\par Offloading to the wound site.\par \par The patient was positioned as follows .  The fluorescence imaging device was positioned 8-12 cm from the patient and the clinical darkness required for imaging was obtained.  The wound measured by [employer]..  The [employer] i:X fluorescence imaging device was used to report presence and location of red or cyan fluorescence, indicative of bacteria at loads greater than 104 CFU/g in real-time.  Images reported to be positive.  Due to presence of infection causing bacteria the wound was cleansed.  Fluorescence images acquired after cleansing reported no reduction in bacteria..  I then turned my attention to debridement, resulting in decrease in the fluorescence image.\par \par She read the consent and signed it prior to the initiation of any screening procedures.  She had the opportunity to discuss any questions regarding the study.  I witnessed the signing of the consent and the patient was given a copy of the signed consent.\par \par \par -----Wound supplies ordered via DME received\par Patient given contact information to DME\par \par Patient instructed on how to change dressing\par \par 4/27/22\par left leg wound extremely painful, was in epiona study for 1 week\par The patient was positioned to allow for optimization of imaging. The fluorescence imaging device was placed 8-12 cm from the patient and the clinical darkness required for imaging was obtained by a dark drape. The wound measured tissue analytics  on the /left ---leg. The [employer] i:X fluorescence imaging device was used to report presence and location of red or cyan fluorescence, indicative of bacteria at loads greater than 104 CFU/g in real-time. Images reported to have ---no/red/cyan----fluorescence. The wound was ----mechanically cleansed with vashe and/or underwent excisional debridement.  Fluorescence images acquired after cleansing demonstrated reduction in bacteria.\par 5/4/22\par culture grew out pseudomonas and staph, on cipro, will wait and take bactrim when finished with first\par 5/11/22\par pt. taking abx. as prescribed\par wound still remains painful, appearance slightly better, been applying gentamicin\par saw vascular - no evidence of venous insufficiency\par slight purplish halo to periwound, wound not as hypergranular\par s/p excisional debridement\par 5/18/22\par saw derm. yesterday, biopsy taken\par placed on doxy. and sulfasalazine\par no debridement today, wound being treated with gentamicin and santyl, slough is improved\par 5/24/22\par still on doxy. sulfasalazine per derm.\par light s/p excisional debridement, of moist yellow slough\par wound is slowly more granular \par using gentamicin\par awaiting pathology of wound\par wound with more pink tissue coming through\par 6/17/22\par wound with slow progress, mechanical debridement\par has been using santyl and gentamicin, granular tissue coming through\par followed up with derm. feels it is related to prior DVT\par responds well to compression\par \par 7/1/22\par s/p excisional debridement, could not tolerate too much, still having pain, parts of wound tender, smaller\par saw derm. grew out pseudomonas, on levaquin, on sulfasalazine 500 mg 2x day\par per derm - no PG\par saw hematology getting a work up\par saw vascular - recommended pentoxifylline , has not taken\par 7/14/22\par still on sulfasalazine \par seeing derm. next week\par pain improved, wound itself is  to touch, remains slightly itchy and red to periwound, drainage minimal, slough over wound, superficial\par responds well to unna\par measured for compression socks\par \par \par \par

## 2022-07-14 NOTE — HISTORY OF PRESENT ILLNESS
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  h/o DVT April 5, 2022 by ultrasound started on Xarelto by PCP Dr. Singh. s/p endometrial tumor with subsequent chemoradiation and DVT 2014 where she was seen by Dr. Stevens. Patient states that she is cancer free. She likes to read and was sitting extensively. She had 4 deaths where she traveled since 6/6/22.  The patient has complaints of pain.   The patient has been dressing the wound with gauze.  The patient denies fevers or chills.  The patient has localized pain to the wound upon dressing changes.  The patient has no other complaints or associated symptoms.. Patient is on amoxicillin. \par \par 5/4/22\par The patient was positioned to allow for optimization of imaging. The fluorescence imaging device was placed 8-12 cm from the patient and the clinical darkness required for imaging was obtained by a dark drape. The wound measured   ----- cm2 on the /left ---leg/foot). The [employer] i:X fluorescence imaging device was used to report presence and location of NO  red or cyan fluorescence, indicative of bacteria at loads greater than 104 CFU/g in real-time. \par s/p excisional debridement\par suspicious for pyoderma, purplish hue to periwound, wound is very painful\par

## 2022-07-14 NOTE — PHYSICAL EXAM
[Normal Breath Sounds] : Normal breath sounds [JVD] : no jugular venous distention  [de-identified] : NAD, ambulatory [de-identified] : AT [de-identified] : supple

## 2022-07-14 NOTE — PLAN
[FreeTextEntry1] : 7/14/22\par Plan - stop gentamicin, resume santyl, nickel thickness/adaptic/triple wrap\par script given for compression socks\par following up with derm. next week, vascular tm.\par follow up 2-3 weeks

## 2022-07-15 ENCOUNTER — APPOINTMENT (OUTPATIENT)
Dept: VASCULAR SURGERY | Facility: CLINIC | Age: 72
End: 2022-07-15

## 2022-07-15 VITALS
SYSTOLIC BLOOD PRESSURE: 152 MMHG | TEMPERATURE: 98 F | WEIGHT: 195 LBS | BODY MASS INDEX: 33.29 KG/M2 | HEART RATE: 91 BPM | HEIGHT: 64 IN | DIASTOLIC BLOOD PRESSURE: 88 MMHG

## 2022-07-15 PROCEDURE — 99214 OFFICE O/P EST MOD 30 MIN: CPT

## 2022-07-15 PROCEDURE — 93970 EXTREMITY STUDY: CPT

## 2022-07-15 RX ORDER — TRIAMCINOLONE ACETONIDE 1 MG/G
0.1 CREAM TOPICAL
Qty: 454 | Refills: 0 | Status: ACTIVE | COMMUNITY
Start: 2022-07-07

## 2022-07-15 RX ORDER — CLOBETASOL PROPIONATE 0.5 MG/G
0.05 CREAM TOPICAL
Qty: 60 | Refills: 0 | Status: ACTIVE | COMMUNITY
Start: 2022-03-08

## 2022-07-15 RX ORDER — LEVOFLOXACIN 750 MG/1
750 TABLET, FILM COATED ORAL
Qty: 10 | Refills: 0 | Status: ACTIVE | COMMUNITY
Start: 2022-06-30

## 2022-07-15 RX ORDER — RIVAROXABAN 20 MG/1
20 TABLET, FILM COATED ORAL
Qty: 90 | Refills: 0 | Status: ACTIVE | COMMUNITY
Start: 2022-06-08

## 2022-07-15 RX ORDER — IBUPROFEN 600 MG/1
600 TABLET, FILM COATED ORAL
Qty: 10 | Refills: 0 | Status: ACTIVE | COMMUNITY
Start: 2022-03-04

## 2022-07-15 RX ORDER — CHOLECALCIFEROL (VITAMIN D3) 50 MCG
500 CAPSULE ORAL
Refills: 0 | Status: ACTIVE | COMMUNITY

## 2022-07-15 RX ORDER — LEVOCETIRIZINE DIHYDROCHLORIDE 5 MG/1
5 TABLET ORAL
Qty: 90 | Refills: 0 | Status: ACTIVE | COMMUNITY
Start: 2022-07-08

## 2022-07-15 RX ORDER — DOXYCYCLINE HYCLATE 100 MG/1
100 TABLET ORAL
Qty: 30 | Refills: 0 | Status: ACTIVE | COMMUNITY
Start: 2022-07-07

## 2022-07-15 RX ORDER — AMOXICILLIN 500 MG/1
500 CAPSULE ORAL
Qty: 28 | Refills: 0 | Status: ACTIVE | COMMUNITY
Start: 2022-03-09

## 2022-07-15 RX ORDER — AMOXICILLIN AND CLAVULANATE POTASSIUM 875; 125 MG/1; MG/1
875-125 TABLET, COATED ORAL
Qty: 20 | Refills: 0 | Status: ACTIVE | COMMUNITY
Start: 2022-04-06

## 2022-07-15 RX ORDER — SULFASALAZINE 500 MG/1
500 TABLET ORAL
Qty: 60 | Refills: 0 | Status: ACTIVE | COMMUNITY
Start: 2022-05-17

## 2022-07-15 NOTE — ASSESSMENT
[FreeTextEntry1] : pt w DVT and GSV reflux.\par cont AC\par \par once DVT is recannalized, pt would be a candidate for GSV ablation.  However, at this time ablation is contraindicated given persistent deep vein occlusion\par \par fu 3 mo repeat duplex

## 2022-07-15 NOTE — PHYSICAL EXAM
[JVD] : no jugular venous distention  [Normal Breath Sounds] : Normal breath sounds [Normal Heart Sounds] : normal heart sounds [2+] : left 2+ [Ankle Swelling (On Exam)] : present [Ankle Swelling Bilaterally] : bilaterally  [] : of the left leg [Ankle Swelling On The Left] : moderate [Alert] : alert [Oriented to Person] : oriented to person [Oriented to Place] : oriented to place [Oriented to Time] : oriented to time [de-identified] : nad [de-identified] : ulcer . LLE wound above medial malleolus approx 6cm in diameter without purulence or foul smell.

## 2022-07-15 NOTE — HISTORY OF PRESENT ILLNESS
[FreeTextEntry1] : 72F with LLE wound medial malleolus present for 3 months despite local wound care and abx. Patient has history of LLE DVT 2014 for which she was on Xarelto- provoked secondary to endometrial CA. Patient now with new LLE DVT diagnosed 4-5 weeks prior at PCP, again on Xarelto. Admits to LE swelling, does not wear compression. Denies claudication symptoms. \par \par PMH- endometrial CA, HTN, HLD, Antolin''s dx\par PSH- hysterectomy\par Meds- metoprolol, statin\par Allergies- NKDA\par Social- denies tobacco, illicit drug use. + EtOH, daily glass of wine. \par   [de-identified] : 6/10/22: telephone visit.  pt has been seeing wound care regularly, also had biopsy w derm.  Per pt biosy showed no malignancy, however I do not have these results.  She is still experiencing some throbbing discomfort, but overall improved.  Wound appearance also noted to be improved.  no fevers.\par \par 7/15/22: wound persists, although appears smaller. Pt is on xarelto for dvt

## 2022-08-04 ENCOUNTER — APPOINTMENT (OUTPATIENT)
Dept: WOUND CARE | Facility: CLINIC | Age: 72
End: 2022-08-04

## 2022-08-04 DIAGNOSIS — I82.402 ACUTE EMBOLISM AND THROMBOSIS OF UNSPECIFIED DEEP VEINS OF LEFT LOWER EXTREMITY: ICD-10-CM

## 2022-08-04 PROCEDURE — 29581 APPL MULTLAYER CMPRN SYS LEG: CPT

## 2022-08-05 PROBLEM — I82.402 LEFT LEG DVT: Status: ACTIVE | Noted: 2022-04-13

## 2022-08-05 NOTE — PHYSICAL EXAM
[Normal Breath Sounds] : Normal breath sounds [JVD] : no jugular venous distention  [de-identified] : NAD, ambulatory [de-identified] : AT [de-identified] : supple

## 2022-08-05 NOTE — PLAN
[FreeTextEntry1] : 8/4/22\par Plan - measured for compression socks, script given, will obtain and start wearing\par left leg - c/w adaptic, washing/kamini and apply garment\par follow up 1 month

## 2022-08-05 NOTE — ASSESSMENT
[FreeTextEntry1] : Wound Assessment and Plan:\par \par The patient presents with a wound to the LLE.  Swelling noted to the extremity.  \par s/p excisional debridement\par DANAE/PVR \par \par Patient underwent evaluation for peripheral arterial disease using a [employer] diagnostic machine.  Ankle brachial index was obtained using blood pressure cuffs of the ankle and brachial segments of both legs.  A distal pulse volume recording was noted digitally on the device.  The procedure was supervised and interpreted by the physician.  DANAE of the right lower extremity error   secondary to movement. DANAE of the left lower extremity 1.2-.  Waveform noted to be biphasic   Patient tolerated procedure well in the office.  Results discussed with the patient.\par \par Standing venous reflux study scheduled.\par No clinical sign of infection\par Recommendation:\par \par \par Leg elevation as tolerated\par Encouraged ambulation or exercise.\par Optimization of nutrition.\par Offloading to the wound site.\par \par The patient was positioned as follows .  The fluorescence imaging device was positioned 8-12 cm from the patient and the clinical darkness required for imaging was obtained.  The wound measured by [employer]..  The [employer] i:X fluorescence imaging device was used to report presence and location of red or cyan fluorescence, indicative of bacteria at loads greater than 104 CFU/g in real-time.  Images reported to be positive.  Due to presence of infection causing bacteria the wound was cleansed.  Fluorescence images acquired after cleansing reported no reduction in bacteria..  I then turned my attention to debridement, resulting in decrease in the fluorescence image.\par \par She read the consent and signed it prior to the initiation of any screening procedures.  She had the opportunity to discuss any questions regarding the study.  I witnessed the signing of the consent and the patient was given a copy of the signed consent.\par \par \par -----Wound supplies ordered via DME received\par Patient given contact information to DME\par \par Patient instructed on how to change dressing\par \par 4/27/22\par left leg wound extremely painful, was in epiona study for 1 week\par The patient was positioned to allow for optimization of imaging. The fluorescence imaging device was placed 8-12 cm from the patient and the clinical darkness required for imaging was obtained by a dark drape. The wound measured tissue analytics  on the /left ---leg. The [employer] i:X fluorescence imaging device was used to report presence and location of red or cyan fluorescence, indicative of bacteria at loads greater than 104 CFU/g in real-time. Images reported to have ---no/red/cyan----fluorescence. The wound was ----mechanically cleansed with vashe and/or underwent excisional debridement.  Fluorescence images acquired after cleansing demonstrated reduction in bacteria.\par 5/4/22\par culture grew out pseudomonas and staph, on cipro, will wait and take bactrim when finished with first\par 5/11/22\par pt. taking abx. as prescribed\par wound still remains painful, appearance slightly better, been applying gentamicin\par saw vascular - no evidence of venous insufficiency\par slight purplish halo to periwound, wound not as hypergranular\par s/p excisional debridement\par 5/18/22\par saw derm. yesterday, biopsy taken\par placed on doxy. and sulfasalazine\par no debridement today, wound being treated with gentamicin and santyl, slough is improved\par 5/24/22\par still on doxy. sulfasalazine per derm.\par light s/p excisional debridement, of moist yellow slough\par wound is slowly more granular \par using gentamicin\par awaiting pathology of wound\par wound with more pink tissue coming through\par 6/17/22\par wound with slow progress, mechanical debridement\par has been using santyl and gentamicin, granular tissue coming through\par followed up with derm. feels it is related to prior DVT\par responds well to compression\par \par 7/1/22\par s/p excisional debridement, could not tolerate too much, still having pain, parts of wound tender, smaller\par saw derm. grew out pseudomonas, on levaquin, on sulfasalazine 500 mg 2x day\par per derm - no PG\par saw hematology getting a work up\par saw vascular - recommended pentoxifylline , has not taken\par 7/14/22\par still on sulfasalazine \par seeing derm. next week\par pain improved, wound itself is  to touch, remains slightly itchy and red to periwound, drainage minimal, slough over wound, superficial\par responds well to unna\par measured for compression socks\par 8/4/22\par left leg wound remains with purplish discoloration to area, scabbing over wound, has been applying adaptic and wrapping, c/o some itching to periwound\par pain is significantly lessened as it has healed\par on xaralto - f/u with vascular\par \par \par \par \par

## 2022-08-05 NOTE — HISTORY OF PRESENT ILLNESS
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  h/o DVT April 5, 2022 by ultrasound started on Xarelto by PCP Dr. Singh. s/p endometrial tumor with subsequent chemoradiation and DVT 2014 where she was seen by Dr. Stevens. Patient states that she is cancer free. She likes to read and was sitting extensively. She had 4 deaths where she traveled since 6/6/22.  The patient has complaints of pain.   The patient has been dressing the wound with gauze.  The patient denies fevers or chills.  The patient has localized pain to the wound upon dressing changes.  The patient has no other complaints or associated symptoms.. Patient is on amoxicillin. \par \par 5/4/22\par The patient was positioned to allow for optimization of imaging. The fluorescence imaging device was placed 8-12 cm from the patient and the clinical darkness required for imaging was obtained by a dark drape. The wound measured   ----- cm2 on the /left ---leg/foot). The [employer] i:X fluorescence imaging device was used to report presence and location of NO  red or cyan fluorescence, indicative of bacteria at loads greater than 104 CFU/g in real-time. \par s/p excisional debridement\par suspicious for pyoderma, purplish hue to periwound, wound is very painful\par

## 2022-09-08 ENCOUNTER — APPOINTMENT (OUTPATIENT)
Dept: WOUND CARE | Facility: CLINIC | Age: 72
End: 2022-09-08

## 2022-10-21 ENCOUNTER — APPOINTMENT (OUTPATIENT)
Dept: VASCULAR SURGERY | Facility: CLINIC | Age: 72
End: 2022-10-21

## 2022-10-21 VITALS
TEMPERATURE: 98.3 F | HEART RATE: 84 BPM | WEIGHT: 195 LBS | SYSTOLIC BLOOD PRESSURE: 134 MMHG | DIASTOLIC BLOOD PRESSURE: 70 MMHG | HEIGHT: 64 IN | BODY MASS INDEX: 33.29 KG/M2

## 2022-10-21 DIAGNOSIS — L97.929 CHRONIC VENOUS HYPERTENSION (IDIOPATHIC) WITH ULCER OF LEFT LOWER EXTREMITY: ICD-10-CM

## 2022-10-21 DIAGNOSIS — I87.312 CHRONIC VENOUS HYPERTENSION (IDIOPATHIC) WITH ULCER OF LEFT LOWER EXTREMITY: ICD-10-CM

## 2022-10-21 PROCEDURE — 99214 OFFICE O/P EST MOD 30 MIN: CPT

## 2022-10-21 PROCEDURE — 93971 EXTREMITY STUDY: CPT

## 2022-10-21 NOTE — REASON FOR VISIT
[Follow-Up: _____] : a [unfilled] follow-up visit
moderate assist (50% patients effort)
minimum assist (75% patients effort)

## 2022-11-14 NOTE — ASSESSMENT
[FreeTextEntry1] : Patient is a 71 yo F with history of LLE DVT and L medial ankle ulcer, on AC and Trental who presents for follow up for wound. US duplex in office shows persistent chronic occlusion of femoral vein and chronic recanalization through the popliteal \par \par - Patient traveling to [place] in November, recommend continue AC until after she returns \par - Can begin ASA 81mg after discontinuing Xarelto \par - Follow up prn for any worsening symptoms

## 2022-11-14 NOTE — PHYSICAL EXAM
[Respiratory Effort] : normal respiratory effort [2+] : left 2+ [Ankle Swelling (On Exam)] : present [] : present [de-identified] : NAD [FreeTextEntry1] : Left medial malleoli wound healing well- no significant drainage

## 2022-11-14 NOTE — HISTORY OF PRESENT ILLNESS
[FreeTextEntry1] : 72F with LLE wound medial malleolus present for 3 months despite local wound care and abx. Patient has history of LLE DVT 2014 for which she was on Xarelto- provoked secondary to endometrial CA. Patient now with new LLE DVT diagnosed 4-5 weeks prior at PCP, again on Xarelto. Admits to LE swelling, does not wear compression. Denies claudication symptoms. \par \par PMH- endometrial CA, HTN, HLD, Antolin''s dx\par PSH- hysterectomy\par Meds- metoprolol, statin\par Allergies- NKDA\par Social- denies tobacco, illicit drug use. + EtOH, daily glass of wine. \par \par \par Interval History: 6/10/22: telephone visit. pt has been seeing wound care regularly, also had biopsy w derm. Per pt biosy showed no malignancy, however I do not have these results. She is still experiencing some throbbing discomfort, but overall improved. Wound appearance also noted to be improved. no fevers.\par \par 7/15/22: wound persists, although appears smaller. Pt is on xarelto for dvt. \par  [de-identified] : 10/21: Patient here for follow up of wound. Wound continues to heal, without any significant drainage. Patient has been following up with dermatology and wound care. Denies any fevers or chills.